# Patient Record
Sex: FEMALE | Race: WHITE | NOT HISPANIC OR LATINO | Employment: FULL TIME | URBAN - METROPOLITAN AREA
[De-identification: names, ages, dates, MRNs, and addresses within clinical notes are randomized per-mention and may not be internally consistent; named-entity substitution may affect disease eponyms.]

---

## 2017-03-10 ENCOUNTER — HOSPITAL ENCOUNTER (OUTPATIENT)
Dept: URGENT CARE | Facility: CLINIC | Age: 22
Discharge: HOME | End: 2017-03-10
Attending: FAMILY MEDICINE | Admitting: FAMILY MEDICINE

## 2023-01-17 ENCOUNTER — HOSPITAL ENCOUNTER (EMERGENCY)
Facility: HOSPITAL | Age: 28
Discharge: HOME | End: 2023-01-18
Attending: EMERGENCY MEDICINE | Admitting: EMERGENCY MEDICINE
Payer: COMMERCIAL

## 2023-01-17 DIAGNOSIS — T43.204A: Primary | ICD-10-CM

## 2023-01-17 LAB
ALBUMIN SERPL-MCNC: 4.6 G/DL (ref 3.5–5.2)
ALBUMIN/GLOB SERPL: 1.8 G/DL
ALP SERPL-CCNC: 85 U/L (ref 39–117)
ALT SERPL W P-5'-P-CCNC: 14 U/L (ref 1–33)
ANION GAP SERPL CALCULATED.3IONS-SCNC: 11 MMOL/L (ref 5–15)
APAP SERPL-MCNC: <5 MCG/ML (ref 0–30)
AST SERPL-CCNC: 13 U/L (ref 1–32)
BASOPHILS # BLD AUTO: 0.1 10*3/MM3 (ref 0–0.2)
BASOPHILS NFR BLD AUTO: 0.9 % (ref 0–1.5)
BILIRUB SERPL-MCNC: 0.5 MG/DL (ref 0–1.2)
BUN SERPL-MCNC: 13 MG/DL (ref 6–20)
BUN/CREAT SERPL: 18.6 (ref 7–25)
CALCIUM SPEC-SCNC: 9.1 MG/DL (ref 8.6–10.5)
CHLORIDE SERPL-SCNC: 105 MMOL/L (ref 98–107)
CO2 SERPL-SCNC: 25 MMOL/L (ref 22–29)
CREAT SERPL-MCNC: 0.7 MG/DL (ref 0.57–1)
DEPRECATED RDW RBC AUTO: 41.6 FL (ref 37–54)
EGFRCR SERPLBLD CKD-EPI 2021: 121.7 ML/MIN/1.73
EOSINOPHIL # BLD AUTO: 0 10*3/MM3 (ref 0–0.4)
EOSINOPHIL NFR BLD AUTO: 0.3 % (ref 0.3–6.2)
ERYTHROCYTE [DISTWIDTH] IN BLOOD BY AUTOMATED COUNT: 13.1 % (ref 12.3–15.4)
ETHANOL UR QL: <0.01 %
GLOBULIN UR ELPH-MCNC: 2.6 GM/DL
GLUCOSE SERPL-MCNC: 115 MG/DL (ref 65–99)
HCT VFR BLD AUTO: 43.1 % (ref 34–46.6)
HGB BLD-MCNC: 14.5 G/DL (ref 12–15.9)
LYMPHOCYTES # BLD AUTO: 2 10*3/MM3 (ref 0.7–3.1)
LYMPHOCYTES NFR BLD AUTO: 23.3 % (ref 19.6–45.3)
MCH RBC QN AUTO: 29.2 PG (ref 26.6–33)
MCHC RBC AUTO-ENTMCNC: 33.6 G/DL (ref 31.5–35.7)
MCV RBC AUTO: 86.9 FL (ref 79–97)
MONOCYTES # BLD AUTO: 0.5 10*3/MM3 (ref 0.1–0.9)
MONOCYTES NFR BLD AUTO: 6.2 % (ref 5–12)
NEUTROPHILS NFR BLD AUTO: 6 10*3/MM3 (ref 1.7–7)
NEUTROPHILS NFR BLD AUTO: 69.3 % (ref 42.7–76)
NRBC BLD AUTO-RTO: 0.1 /100 WBC (ref 0–0.2)
PLATELET # BLD AUTO: 260 10*3/MM3 (ref 140–450)
PMV BLD AUTO: 7.5 FL (ref 6–12)
POTASSIUM SERPL-SCNC: 3.5 MMOL/L (ref 3.5–5.2)
PROT SERPL-MCNC: 7.2 G/DL (ref 6–8.5)
RBC # BLD AUTO: 4.95 10*6/MM3 (ref 3.77–5.28)
SALICYLATES SERPL-MCNC: <0.3 MG/DL
SODIUM SERPL-SCNC: 141 MMOL/L (ref 136–145)
WBC NRBC COR # BLD: 8.6 10*3/MM3 (ref 3.4–10.8)

## 2023-01-17 PROCEDURE — 82077 ASSAY SPEC XCP UR&BREATH IA: CPT | Performed by: EMERGENCY MEDICINE

## 2023-01-17 PROCEDURE — 80053 COMPREHEN METABOLIC PANEL: CPT | Performed by: EMERGENCY MEDICINE

## 2023-01-17 PROCEDURE — 85025 COMPLETE CBC W/AUTO DIFF WBC: CPT | Performed by: EMERGENCY MEDICINE

## 2023-01-17 PROCEDURE — 80143 DRUG ASSAY ACETAMINOPHEN: CPT | Performed by: EMERGENCY MEDICINE

## 2023-01-17 PROCEDURE — 99284 EMERGENCY DEPT VISIT MOD MDM: CPT

## 2023-01-17 PROCEDURE — 80179 DRUG ASSAY SALICYLATE: CPT | Performed by: EMERGENCY MEDICINE

## 2023-01-17 PROCEDURE — 93005 ELECTROCARDIOGRAM TRACING: CPT | Performed by: EMERGENCY MEDICINE

## 2023-01-17 RX ORDER — SODIUM CHLORIDE 0.9 % (FLUSH) 0.9 %
10 SYRINGE (ML) INJECTION AS NEEDED
Status: DISCONTINUED | OUTPATIENT
Start: 2023-01-17 | End: 2023-01-18 | Stop reason: HOSPADM

## 2023-01-17 RX ADMIN — POISON ADSORBENT 50 G: 50 SUSPENSION ORAL at 23:23

## 2023-01-17 RX ADMIN — SODIUM CHLORIDE, POTASSIUM CHLORIDE, SODIUM LACTATE AND CALCIUM CHLORIDE 1000 ML: 600; 310; 30; 20 INJECTION, SOLUTION INTRAVENOUS at 23:21

## 2023-01-17 NOTE — Clinical Note
[facility] EMERGENCY DEPARTMENT  [address] [place] [address]  Phone: [phone]    David accompanied Lucille Melo to the emergency department on 1/17/2023. They may return to work on 01/19/2023.        Thank you for choosing [facility].    Gerardo Benitez MD

## 2023-01-17 NOTE — Clinical Note
[facility] EMERGENCY DEPARTMENT  [address] [place] [address]  Phone: [phone]    Lucille Melo was seen and treated in our emergency department on 1/17/2023.  She may return to work on 01/19/2023.         Thank you for choosing [facility].    Gerardo Benitez MD

## 2023-01-18 VITALS
DIASTOLIC BLOOD PRESSURE: 74 MMHG | BODY MASS INDEX: 32.43 KG/M2 | HEIGHT: 63 IN | SYSTOLIC BLOOD PRESSURE: 118 MMHG | WEIGHT: 183 LBS | TEMPERATURE: 98.1 F | OXYGEN SATURATION: 96 % | HEART RATE: 92 BPM | RESPIRATION RATE: 19 BRPM

## 2023-01-18 LAB
AMPHET+METHAMPHET UR QL: NEGATIVE
BARBITURATES UR QL SCN: NEGATIVE
BENZODIAZ UR QL SCN: NEGATIVE
CANNABINOIDS SERPL QL: NEGATIVE
COCAINE UR QL: NEGATIVE
HOLD SPECIMEN: NORMAL
METHADONE UR QL SCN: NEGATIVE
OPIATES UR QL: NEGATIVE
OXYCODONE UR QL SCN: NEGATIVE
WHOLE BLOOD HOLD COAG: NORMAL

## 2023-01-18 PROCEDURE — 80307 DRUG TEST PRSMV CHEM ANLYZR: CPT | Performed by: EMERGENCY MEDICINE

## 2023-01-18 PROCEDURE — 96374 THER/PROPH/DIAG INJ IV PUSH: CPT

## 2023-01-18 PROCEDURE — 25010000002 ONDANSETRON PER 1 MG: Performed by: EMERGENCY MEDICINE

## 2023-01-18 RX ORDER — ONDANSETRON 2 MG/ML
4 INJECTION INTRAMUSCULAR; INTRAVENOUS ONCE
Status: COMPLETED | OUTPATIENT
Start: 2023-01-18 | End: 2023-01-18

## 2023-01-18 RX ORDER — ACETAMINOPHEN 500 MG
1000 TABLET ORAL ONCE
Status: DISCONTINUED | OUTPATIENT
Start: 2023-01-18 | End: 2023-01-18 | Stop reason: HOSPADM

## 2023-01-18 RX ADMIN — ONDANSETRON 4 MG: 2 INJECTION INTRAMUSCULAR; INTRAVENOUS at 03:29
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
## 2023-01-18 NOTE — ED NOTES
Poison control updated. They states that patient will need to be observed until 0600. ER provider and patient made aware.

## 2023-01-18 NOTE — ED NOTES
Patient had successful emesis post charcoal. Patient also states that she has had a partial hysterectomy and can not get pregnant.  ER provider notified. UA pregnancy DC'd
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
## 2023-01-18 NOTE — DISCHARGE INSTRUCTIONS
Follow-up with your therapist today.  Return for suicidal thoughts vomiting altered mental status reoccurrence of symptoms or any other new or worsening problems or concerns  Rest plenty of fluids today drink lots of water.  Follow-up as directed above

## 2023-01-24 LAB — QT INTERVAL: 351 MS

## 2025-06-18 ENCOUNTER — HOSPITAL ENCOUNTER (EMERGENCY)
Facility: HOSPITAL | Age: 30
Discharge: HOME | End: 2025-06-18
Attending: EMERGENCY MEDICINE
Payer: COMMERCIAL

## 2025-06-18 ENCOUNTER — APPOINTMENT (OUTPATIENT)
Dept: CT IMAGING | Facility: HOSPITAL | Age: 30
End: 2025-06-18
Payer: COMMERCIAL

## 2025-06-18 VITALS
OXYGEN SATURATION: 99 % | TEMPERATURE: 98.2 F | WEIGHT: 156 LBS | BODY MASS INDEX: 26.63 KG/M2 | RESPIRATION RATE: 16 BRPM | HEART RATE: 82 BPM | SYSTOLIC BLOOD PRESSURE: 116 MMHG | HEIGHT: 64 IN | DIASTOLIC BLOOD PRESSURE: 75 MMHG

## 2025-06-18 DIAGNOSIS — R59.1 LYMPHADENOPATHY: ICD-10-CM

## 2025-06-18 DIAGNOSIS — R51.9 ACUTE NONINTRACTABLE HEADACHE, UNSPECIFIED HEADACHE TYPE: Primary | ICD-10-CM

## 2025-06-18 LAB — STREP A PCR: NOT DETECTED

## 2025-06-18 PROCEDURE — 96361 HYDRATE IV INFUSION ADD-ON: CPT

## 2025-06-18 PROCEDURE — 70450 CT HEAD/BRAIN W/O DYE: CPT

## 2025-06-18 PROCEDURE — 96375 TX/PRO/DX INJ NEW DRUG ADDON: CPT

## 2025-06-18 PROCEDURE — 99283 EMERGENCY DEPT VISIT LOW MDM: CPT | Performed by: EMERGENCY MEDICINE

## 2025-06-18 PROCEDURE — 25010000002 ONDANSETRON PER 1 MG: Performed by: EMERGENCY MEDICINE

## 2025-06-18 PROCEDURE — 25810000003 SODIUM CHLORIDE 0.9 % SOLUTION: Performed by: EMERGENCY MEDICINE

## 2025-06-18 PROCEDURE — 99284 EMERGENCY DEPT VISIT MOD MDM: CPT

## 2025-06-18 PROCEDURE — 72125 CT NECK SPINE W/O DYE: CPT

## 2025-06-18 PROCEDURE — 87081 CULTURE SCREEN ONLY: CPT | Performed by: EMERGENCY MEDICINE

## 2025-06-18 PROCEDURE — 87651 STREP A DNA AMP PROBE: CPT | Performed by: EMERGENCY MEDICINE

## 2025-06-18 PROCEDURE — 25010000002 KETOROLAC TROMETHAMINE PER 15 MG: Performed by: EMERGENCY MEDICINE

## 2025-06-18 PROCEDURE — 96374 THER/PROPH/DIAG INJ IV PUSH: CPT

## 2025-06-18 RX ORDER — ONDANSETRON 4 MG/1
4 TABLET, ORALLY DISINTEGRATING ORAL EVERY 8 HOURS PRN
Qty: 12 TABLET | Refills: 0 | Status: SHIPPED | OUTPATIENT
Start: 2025-06-18

## 2025-06-18 RX ORDER — KETOROLAC TROMETHAMINE 30 MG/ML
15 INJECTION, SOLUTION INTRAMUSCULAR; INTRAVENOUS ONCE
Status: COMPLETED | OUTPATIENT
Start: 2025-06-18 | End: 2025-06-18

## 2025-06-18 RX ORDER — ONDANSETRON 2 MG/ML
4 INJECTION INTRAMUSCULAR; INTRAVENOUS ONCE
Status: COMPLETED | OUTPATIENT
Start: 2025-06-18 | End: 2025-06-18

## 2025-06-18 RX ORDER — SODIUM CHLORIDE 0.9 % (FLUSH) 0.9 %
10 SYRINGE (ML) INJECTION AS NEEDED
Status: DISCONTINUED | OUTPATIENT
Start: 2025-06-18 | End: 2025-06-19 | Stop reason: HOSPADM

## 2025-06-18 RX ORDER — ACETAMINOPHEN 325 MG/1
650 TABLET ORAL ONCE
Status: COMPLETED | OUTPATIENT
Start: 2025-06-18 | End: 2025-06-18

## 2025-06-18 RX ADMIN — KETOROLAC TROMETHAMINE 15 MG: 30 INJECTION, SOLUTION INTRAMUSCULAR at 23:11

## 2025-06-18 RX ADMIN — SODIUM CHLORIDE 1000 ML: 9 INJECTION, SOLUTION INTRAVENOUS at 22:38

## 2025-06-18 RX ADMIN — ACETAMINOPHEN 650 MG: 325 TABLET, FILM COATED ORAL at 22:16

## 2025-06-18 RX ADMIN — ONDANSETRON 4 MG: 2 INJECTION, SOLUTION INTRAMUSCULAR; INTRAVENOUS at 22:40

## 2025-06-19 NOTE — FSED PROVIDER NOTE
Subjective   History of Present Illness  30 yof complains of headache. The patient states he has been having migraines for the past month. She saw her Doctor who prescribed her medication. Over the past few days she has noticed pain in the right side of her neck. She notes it is worse when she turns her head. She denies injury. Tonight she noticed some bumps on the back of her head. She has also had a sore throat and her  was recently diagnosed with strep.       Review of Systems   Constitutional: Negative.    HENT:  Positive for sore throat.    Musculoskeletal:  Positive for neck pain.   Neurological:  Positive for headaches.   All other systems reviewed and are negative.      Past Medical History:   Diagnosis Date    ADHD     Bipolar 1 disorder, depressed        Allergies   Allergen Reactions    Penicillins Anaphylaxis       Past Surgical History:   Procedure Laterality Date    CYST REMOVAL Left 2013    hand    DILATION AND CURETTAGE, DIAGNOSTIC / THERAPEUTIC  2017    for undeliverd placenta    LAPAROSCOPIC TUBAL LIGATION         Family History   Problem Relation Age of Onset    Uterine cancer Mother     Lupus Mother     Fibromyalgia Mother     Hypertension Father     Diabetes Father        Social History     Socioeconomic History    Marital status:    Tobacco Use    Smoking status: Never     Passive exposure: Past (as a child)    Smokeless tobacco: Never   Vaping Use    Vaping status: Never Used   Substance and Sexual Activity    Alcohol use: Not Currently    Drug use: Never    Sexual activity: Defer           Objective   Physical Exam  Vitals reviewed.   Constitutional:       Appearance: Normal appearance.   HENT:      Head: Normocephalic and atraumatic.      Right Ear: Tympanic membrane, ear canal and external ear normal.      Left Ear: Tympanic membrane, ear canal and external ear normal.      Mouth/Throat:      Mouth: Mucous membranes are moist.      Pharynx: Oropharynx is clear. Posterior  oropharyngeal erythema present.   Eyes:      Extraocular Movements: Extraocular movements intact.      Pupils: Pupils are equal, round, and reactive to light.   Neck:      Trachea: Trachea and phonation normal.      Meningeal: Brudzinski's sign and Kernig's sign absent.     Cardiovascular:      Rate and Rhythm: Normal rate and regular rhythm.      Pulses: Normal pulses.      Heart sounds: Normal heart sounds.   Pulmonary:      Effort: Pulmonary effort is normal.      Breath sounds: Normal breath sounds.   Musculoskeletal:         General: Normal range of motion.      Cervical back: Normal range of motion and neck supple. No rigidity. Muscular tenderness present.   Lymphadenopathy:      Cervical: Cervical adenopathy present.   Skin:     General: Skin is warm and dry.   Neurological:      Mental Status: She is alert.         Procedures           ED Course  ED Course as of 06/19/25 0133   Wed Jun 18, 2025   2086 Pt is feeling better. Discussed test results and treatment plan.  [BM]      ED Course User Index  [BM] Cindy Moore MD                                           Medical Decision Making  This patient presents with a headache most consistent with benign headache from either tension type headache vs migraine. No headache red flags. Neurologic exam without evidence of meningismus, AMS, focal neurologic findings so doubt meningitis, encephalitis, stroke. Presentation not consistent with acute intracranial bleed to include SAH (lack of risk factors, headache history). No history of trauma so doubt ICH. Given history and physical temporal arteritis unlikely, as is acute angle closure glaucoma. Doubt carotid artery dissection given no focal neuro deficits, no neck trauma or recent neck strain. Patient with no signs of increased intracranial pressure or weight loss and history and physical suggest more benign headache so less likely mass effect in brain from tumor or abscess or idiopathic intracranial  hypertension. Pain was controlled with headache cocktail and patient discharged home with PMD follow up.    Problems Addressed:  Acute nonintractable headache, unspecified headache type: complicated acute illness or injury  Lymphadenopathy: complicated acute illness or injury    Amount and/or Complexity of Data Reviewed  Radiology: ordered.    Risk  OTC drugs.  Prescription drug management.        Final diagnoses:   Acute nonintractable headache, unspecified headache type   Lymphadenopathy       ED Disposition  ED Disposition       ED Disposition   Discharge    Condition   Stable    Comment   --               [facility],   [id number]  [place] [address]  [phone]               Medication List        New Prescriptions      diclofenac 50 MG EC tablet  Commonly known as: VOLTAREN  Take 1 tablet by mouth 2 (Two) Times a Day As Needed (pain).     ondansetron ODT 4 MG disintegrating tablet  Commonly known as: ZOFRAN-ODT  Place 1 tablet on the tongue Every 8 (Eight) Hours As Needed for Nausea.            Stop      promethazine 25 MG suppository  Commonly known as: PHENERGAN     promethazine 25 MG tablet  Commonly known as: PHENERGAN     sucralfate 1 g tablet  Commonly known as: CARAFATE               Where to Get Your Medications        These medications were sent to [facility]/pharm[facility] #[id number] - [place], [place] - [address] [place] [address] - [phone]  - [phone] [address] [place] [address]      Hours: 24-hours Phone: [phone]   diclofenac 50 MG EC tablet  ondansetron ODT 4 MG disintegrating tablet

## 2025-06-21 LAB — BACTERIA SPEC AEROBE CULT: NORMAL

## 2025-06-23 ENCOUNTER — HOSPITAL ENCOUNTER (OUTPATIENT)
Facility: HOSPITAL | Age: 30
Setting detail: OBSERVATION
Discharge: HOME | End: 2025-06-24
Attending: STUDENT IN AN ORGANIZED HEALTH CARE EDUCATION/TRAINING PROGRAM | Admitting: STUDENT IN AN ORGANIZED HEALTH CARE EDUCATION/TRAINING PROGRAM
Payer: COMMERCIAL

## 2025-06-23 ENCOUNTER — APPOINTMENT (OUTPATIENT)
Dept: CT IMAGING | Facility: HOSPITAL | Age: 30
End: 2025-06-23
Payer: COMMERCIAL

## 2025-06-23 PROBLEM — N73.9 PID (PELVIC INFLAMMATORY DISEASE): Status: ACTIVE | Noted: 2025-06-23

## 2025-06-23 PROBLEM — R10.2 PELVIC PAIN: Status: ACTIVE | Noted: 2025-06-23

## 2025-06-23 LAB
ALBUMIN SERPL-MCNC: 4.2 G/DL (ref 3.5–5.2)
ALBUMIN/GLOB SERPL: 1.8 G/DL
ALP SERPL-CCNC: 63 U/L (ref 39–117)
ALT SERPL W P-5'-P-CCNC: 14 U/L (ref 1–33)
ANION GAP SERPL CALCULATED.3IONS-SCNC: 9.1 MMOL/L (ref 5–15)
AST SERPL-CCNC: 17 U/L (ref 1–32)
BASOPHILS # BLD AUTO: 0.09 10*3/MM3 (ref 0–0.2)
BASOPHILS NFR BLD AUTO: 1.5 % (ref 0–1.5)
BILIRUB SERPL-MCNC: 0.4 MG/DL (ref 0–1.2)
BILIRUB UR QL STRIP: NEGATIVE
BUN SERPL-MCNC: 4.2 MG/DL (ref 6–20)
BUN/CREAT SERPL: 7.5 (ref 7–25)
C TRACH DNA SPEC QL NAA+PROBE: NOT DETECTED
CALCIUM SPEC-SCNC: 8.7 MG/DL (ref 8.6–10.5)
CHLORIDE SERPL-SCNC: 106 MMOL/L (ref 98–107)
CLARITY UR: ABNORMAL
CO2 SERPL-SCNC: 23.9 MMOL/L (ref 22–29)
COLOR UR: YELLOW
CREAT SERPL-MCNC: 0.56 MG/DL (ref 0.57–1)
DEPRECATED RDW RBC AUTO: 41.1 FL (ref 37–54)
EGFRCR SERPLBLD CKD-EPI 2021: 126.1 ML/MIN/1.73
EOSINOPHIL # BLD AUTO: 0.15 10*3/MM3 (ref 0–0.4)
EOSINOPHIL NFR BLD AUTO: 2.5 % (ref 0.3–6.2)
ERYTHROCYTE [DISTWIDTH] IN BLOOD BY AUTOMATED COUNT: 12.4 % (ref 12.3–15.4)
GLOBULIN UR ELPH-MCNC: 2.3 GM/DL
GLUCOSE SERPL-MCNC: 88 MG/DL (ref 65–99)
GLUCOSE UR STRIP-MCNC: NEGATIVE MG/DL
HCT VFR BLD AUTO: 39.1 % (ref 34–46.6)
HGB BLD-MCNC: 12 G/DL (ref 12–15.9)
HGB UR QL STRIP.AUTO: NEGATIVE
IMM GRANULOCYTES # BLD AUTO: 0.01 10*3/MM3 (ref 0–0.05)
IMM GRANULOCYTES NFR BLD AUTO: 0.2 % (ref 0–0.5)
KETONES UR QL STRIP: NEGATIVE
LEUKOCYTE ESTERASE UR QL STRIP.AUTO: NEGATIVE
LYMPHOCYTES # BLD AUTO: 1.73 10*3/MM3 (ref 0.7–3.1)
LYMPHOCYTES NFR BLD AUTO: 28.5 % (ref 19.6–45.3)
MCH RBC QN AUTO: 28.3 PG (ref 26.6–33)
MCHC RBC AUTO-ENTMCNC: 30.7 G/DL (ref 31.5–35.7)
MCV RBC AUTO: 92.2 FL (ref 79–97)
MONOCYTES # BLD AUTO: 0.59 10*3/MM3 (ref 0.1–0.9)
MONOCYTES NFR BLD AUTO: 9.7 % (ref 5–12)
N GONORRHOEA RRNA SPEC QL NAA+PROBE: NOT DETECTED
NEUTROPHILS NFR BLD AUTO: 3.51 10*3/MM3 (ref 1.7–7)
NEUTROPHILS NFR BLD AUTO: 57.6 % (ref 42.7–76)
NITRITE UR QL STRIP: NEGATIVE
NRBC BLD AUTO-RTO: 0 /100 WBC (ref 0–0.2)
PH UR STRIP.AUTO: 8 [PH] (ref 5–8)
PLATELET # BLD AUTO: 234 10*3/MM3 (ref 140–450)
PMV BLD AUTO: 9.2 FL (ref 6–12)
POTASSIUM SERPL-SCNC: 3.9 MMOL/L (ref 3.5–5.2)
PROT SERPL-MCNC: 6.5 G/DL (ref 6–8.5)
PROT UR QL STRIP: NEGATIVE
RBC # BLD AUTO: 4.24 10*6/MM3 (ref 3.77–5.28)
SODIUM SERPL-SCNC: 139 MMOL/L (ref 136–145)
SP GR UR STRIP: 1.01 (ref 1–1.03)
TRICHOMONAS VAGINALIS PCR: NOT DETECTED
UROBILINOGEN UR QL STRIP: ABNORMAL
WBC NRBC COR # BLD AUTO: 6.08 10*3/MM3 (ref 3.4–10.8)

## 2025-06-23 PROCEDURE — 25010000002 CEFTRIAXONE PER 250 MG: Performed by: STUDENT IN AN ORGANIZED HEALTH CARE EDUCATION/TRAINING PROGRAM

## 2025-06-23 PROCEDURE — 80053 COMPREHEN METABOLIC PANEL: CPT | Performed by: STUDENT IN AN ORGANIZED HEALTH CARE EDUCATION/TRAINING PROGRAM

## 2025-06-23 PROCEDURE — 74177 CT ABD & PELVIS W/CONTRAST: CPT

## 2025-06-23 PROCEDURE — 25010000002 METRONIDAZOLE 500 MG/100ML SOLUTION: Performed by: STUDENT IN AN ORGANIZED HEALTH CARE EDUCATION/TRAINING PROGRAM

## 2025-06-23 PROCEDURE — G0378 HOSPITAL OBSERVATION PER HR: HCPCS

## 2025-06-23 PROCEDURE — 87591 N.GONORRHOEAE DNA AMP PROB: CPT | Performed by: STUDENT IN AN ORGANIZED HEALTH CARE EDUCATION/TRAINING PROGRAM

## 2025-06-23 PROCEDURE — 25010000002 KETOROLAC TROMETHAMINE PER 15 MG: Performed by: STUDENT IN AN ORGANIZED HEALTH CARE EDUCATION/TRAINING PROGRAM

## 2025-06-23 PROCEDURE — 96374 THER/PROPH/DIAG INJ IV PUSH: CPT

## 2025-06-23 PROCEDURE — 81003 URINALYSIS AUTO W/O SCOPE: CPT | Performed by: STUDENT IN AN ORGANIZED HEALTH CARE EDUCATION/TRAINING PROGRAM

## 2025-06-23 PROCEDURE — 25510000001 IOPAMIDOL PER 1 ML: Performed by: STUDENT IN AN ORGANIZED HEALTH CARE EDUCATION/TRAINING PROGRAM

## 2025-06-23 PROCEDURE — 87491 CHLMYD TRACH DNA AMP PROBE: CPT | Performed by: STUDENT IN AN ORGANIZED HEALTH CARE EDUCATION/TRAINING PROGRAM

## 2025-06-23 PROCEDURE — 25010000002 DOXYCYCLINE 100 MG RECONSTITUTED SOLUTION 1 EACH VIAL: Performed by: STUDENT IN AN ORGANIZED HEALTH CARE EDUCATION/TRAINING PROGRAM

## 2025-06-23 PROCEDURE — G0379 DIRECT REFER HOSPITAL OBSERV: HCPCS

## 2025-06-23 PROCEDURE — 85025 COMPLETE CBC W/AUTO DIFF WBC: CPT | Performed by: STUDENT IN AN ORGANIZED HEALTH CARE EDUCATION/TRAINING PROGRAM

## 2025-06-23 PROCEDURE — 96376 TX/PRO/DX INJ SAME DRUG ADON: CPT

## 2025-06-23 PROCEDURE — 87661 TRICHOMONAS VAGINALIS AMPLIF: CPT | Performed by: STUDENT IN AN ORGANIZED HEALTH CARE EDUCATION/TRAINING PROGRAM

## 2025-06-23 RX ORDER — SODIUM CHLORIDE 0.9 % (FLUSH) 0.9 %
10 SYRINGE (ML) INJECTION EVERY 12 HOURS SCHEDULED
Status: DISCONTINUED | OUTPATIENT
Start: 2025-06-23 | End: 2025-06-24 | Stop reason: HOSPADM

## 2025-06-23 RX ORDER — ACETAMINOPHEN 500 MG
1000 TABLET ORAL EVERY 6 HOURS PRN
Status: DISCONTINUED | OUTPATIENT
Start: 2025-06-23 | End: 2025-06-24 | Stop reason: HOSPADM

## 2025-06-23 RX ORDER — FERROUS SULFATE 325(65) MG
325 TABLET ORAL
COMMUNITY

## 2025-06-23 RX ORDER — IOPAMIDOL 755 MG/ML
100 INJECTION, SOLUTION INTRAVASCULAR
Status: COMPLETED | OUTPATIENT
Start: 2025-06-23 | End: 2025-06-23

## 2025-06-23 RX ORDER — SACCHAROMYCES BOULARDII 250 MG
250 CAPSULE ORAL 2 TIMES DAILY
COMMUNITY

## 2025-06-23 RX ORDER — METRONIDAZOLE 500 MG/100ML
500 INJECTION, SOLUTION INTRAVENOUS EVERY 12 HOURS
Status: DISCONTINUED | OUTPATIENT
Start: 2025-06-23 | End: 2025-06-24

## 2025-06-23 RX ORDER — OXYCODONE HYDROCHLORIDE 5 MG/1
5 TABLET ORAL EVERY 6 HOURS PRN
Refills: 0 | Status: DISCONTINUED | OUTPATIENT
Start: 2025-06-23 | End: 2025-06-24 | Stop reason: HOSPADM

## 2025-06-23 RX ORDER — KETOROLAC TROMETHAMINE 30 MG/ML
30 INJECTION, SOLUTION INTRAMUSCULAR; INTRAVENOUS EVERY 6 HOURS PRN
Status: DISCONTINUED | OUTPATIENT
Start: 2025-06-23 | End: 2025-06-24 | Stop reason: HOSPADM

## 2025-06-23 RX ORDER — SODIUM CHLORIDE 9 MG/ML
40 INJECTION, SOLUTION INTRAVENOUS AS NEEDED
Status: DISCONTINUED | OUTPATIENT
Start: 2025-06-23 | End: 2025-06-24 | Stop reason: HOSPADM

## 2025-06-23 RX ORDER — SODIUM CHLORIDE 0.9 % (FLUSH) 0.9 %
10 SYRINGE (ML) INJECTION AS NEEDED
Status: DISCONTINUED | OUTPATIENT
Start: 2025-06-23 | End: 2025-06-24 | Stop reason: HOSPADM

## 2025-06-23 RX ADMIN — IOPAMIDOL 100 ML: 755 INJECTION, SOLUTION INTRAVENOUS at 18:15

## 2025-06-23 RX ADMIN — KETOROLAC TROMETHAMINE 30 MG: 30 INJECTION INTRAMUSCULAR; INTRAVENOUS at 22:17

## 2025-06-23 RX ADMIN — KETOROLAC TROMETHAMINE 30 MG: 30 INJECTION INTRAMUSCULAR; INTRAVENOUS at 15:55

## 2025-06-23 RX ADMIN — METRONIDAZOLE 500 MG: 500 INJECTION, SOLUTION INTRAVENOUS at 17:43

## 2025-06-23 RX ADMIN — CEFTRIAXONE SODIUM 1000 MG: 1 INJECTION, POWDER, FOR SOLUTION INTRAMUSCULAR; INTRAVENOUS at 19:13

## 2025-06-23 RX ADMIN — DOXYCYCLINE 100 MG: 100 INJECTION, POWDER, LYOPHILIZED, FOR SOLUTION INTRAVENOUS at 15:50

## 2025-06-23 NOTE — LETTER
June 23, 2025      [facility]  [address] [place] [address] [phone]          Patient: Lucille Melo   YOB: 1995   Date of Visit: 6/23/2025       To Whom It May Concern:    Lucille Melo was seen at [facility] on 6/23/2025.    Please excuse Bonifacio Melo from work today.        Sincerely,       Dr. Frieda Alcantar

## 2025-06-23 NOTE — LETTER
June 23, 2025      [facility]  [address] [place] [address] [phone]          Patient: Lucille Melo   YOB: 1995   Date of Visit: 6/23/2025       To Whom It May Concern:    Lucille Melo was seen at [facility] on 6/23/2025.    Please excuse Bonifacio Melo from work today.        Sincerely,       Dr. Frieda Alcantar

## 2025-06-23 NOTE — LETTER
June 24, 2025     Patient: Lucille Melo   YOB: 1995   Date of Visit: 6/23/2025       To Whom It May Concern:    Lucille Melo was admitted on 6/23/2025, treated and discharged on 6/24/25.  It is my medical opinion that Lucille Melo {Work release (duty restriction):[id number]}.           Sincerely,        No name on file    CC: No Recipients

## 2025-06-24 VITALS
HEART RATE: 76 BPM | DIASTOLIC BLOOD PRESSURE: 76 MMHG | OXYGEN SATURATION: 100 % | TEMPERATURE: 97.9 F | WEIGHT: 164.4 LBS | SYSTOLIC BLOOD PRESSURE: 118 MMHG | RESPIRATION RATE: 19 BRPM | BODY MASS INDEX: 28.22 KG/M2

## 2025-06-24 PROCEDURE — 25010000002 DOXYCYCLINE 100 MG RECONSTITUTED SOLUTION 1 EACH VIAL: Performed by: STUDENT IN AN ORGANIZED HEALTH CARE EDUCATION/TRAINING PROGRAM

## 2025-06-24 PROCEDURE — 96376 TX/PRO/DX INJ SAME DRUG ADON: CPT

## 2025-06-24 PROCEDURE — G0378 HOSPITAL OBSERVATION PER HR: HCPCS

## 2025-06-24 PROCEDURE — 25010000002 KETOROLAC TROMETHAMINE PER 15 MG: Performed by: STUDENT IN AN ORGANIZED HEALTH CARE EDUCATION/TRAINING PROGRAM

## 2025-06-24 PROCEDURE — 25010000002 METRONIDAZOLE 500 MG/100ML SOLUTION: Performed by: STUDENT IN AN ORGANIZED HEALTH CARE EDUCATION/TRAINING PROGRAM

## 2025-06-24 RX ORDER — METRONIDAZOLE 500 MG/1
500 TABLET ORAL EVERY 12 HOURS SCHEDULED
Status: DISCONTINUED | OUTPATIENT
Start: 2025-06-24 | End: 2025-06-24 | Stop reason: HOSPADM

## 2025-06-24 RX ORDER — DOXYCYCLINE 100 MG/1
100 CAPSULE ORAL EVERY 12 HOURS SCHEDULED
Status: DISCONTINUED | OUTPATIENT
Start: 2025-06-24 | End: 2025-06-24 | Stop reason: HOSPADM

## 2025-06-24 RX ADMIN — DOXYCYCLINE 100 MG: 100 INJECTION, POWDER, LYOPHILIZED, FOR SOLUTION INTRAVENOUS at 02:51

## 2025-06-24 RX ADMIN — KETOROLAC TROMETHAMINE 30 MG: 30 INJECTION INTRAMUSCULAR; INTRAVENOUS at 06:30

## 2025-06-24 RX ADMIN — KETOROLAC TROMETHAMINE 30 MG: 30 INJECTION INTRAMUSCULAR; INTRAVENOUS at 12:22

## 2025-06-24 RX ADMIN — METRONIDAZOLE 500 MG: 500 INJECTION, SOLUTION INTRAVENOUS at 05:45

## 2025-06-24 RX ADMIN — Medication 10 ML: at 12:22

## 2025-06-24 RX ADMIN — ACETAMINOPHEN 1000 MG: 500 TABLET, FILM COATED ORAL at 07:38

## 2025-06-24 RX ADMIN — DOXYCYCLINE 100 MG: 100 CAPSULE ORAL at 14:48

## 2025-06-24 NOTE — PLAN OF CARE
Goal Outcome Evaluation:  Plan of Care Reviewed With: patient           Outcome Evaluation: Discharge instructions given and explained. warning s/s, when to call MD or come to ER was discussed. Pt to call for f/u in 1-2 weeks. Pt left per ambulatory to car to home.

## 2025-06-24 NOTE — PROGRESS NOTES
LOS: 1 day   Patient Care Team:  Ranjit Cavanaugh MD as PCP - General (Family Medicine)    Chief Complaint:  pelvic pain    Subjective     Interval History:     Patient Complaints: Patient admitted yesterday for persistent pelvic pain and sent from office d/t US-GYN with findings concerning for possible PID/TOA.  She was sent from office and started IV antibiotics.  States continued pain that is lower pelvic region since started on IV antibiotics yesterday.  All labs unremarkable and WBC WNR, normal UA, and negative cultures for G/C/T.  BV/yeast Nuswab continued pending status.  Patient denies n/v and tolerating po fluids and normal diet at this time.  Pain slightly improved with Torodol and Tylenol this morning but reports 4/10 at this time.  Reports normal BM and regular stool softener use prn at home.   Reports last BM was last night around midnight.    History taken from: patient and chart    Review of Systems:    Same as H&P    Objective     Vital Signs  Vitals:    06/23/25 2310 06/23/25 2313 06/24/25 0314 06/24/25 0731   BP: 118/76 128/71 114/75 91/56   BP Location: Right arm Right arm Right arm Right arm   Patient Position: Sitting Lying Lying Lying   Pulse: 93 93 85 76   Resp: 17 16 17 18   Temp: 97.9 °F (36.6 °C) 98.3 °F (36.8 °C) 97.9 °F (36.6 °C) 98.2 °F (36.8 °C)   TempSrc: Oral Oral Oral Oral   SpO2: 97% 97% 98% 98%   Weight:           Physical Exam:     General Appearance:    Alert, cooperative, in no acute distress   Lungs:     Clear to auscultation,respirations regular, even and                   unlabored    Heart:    Regular rhythm and normal rate.   Breast Exam:    Deferred   Abdomen:     Normal bowel sounds, no masses, no organomegaly, soft        non-tender, non-distended, no guarding, no rebound                 tenderness   Genitalia:    Deferred   Extremities:   Moves all extremities well, no edema, no cyanosis, no             redness        Labs:  Lab Results (last 48 hours)        Procedure Component Value Units Date/Time    Chlamydia trachomatis, Neisseria gonorrhoeae, Trichomonas vaginalis, PCR - Urine, Urine, Clean Catch [[id number]]  (Normal) Collected: 06/23/25 1439    Specimen: Urine, Clean Catch Updated: 06/23/25 1628     Chlamydia by PCR Not Detected     Neisseria gonorrhoeae by PCR Not Detected     Trichomonas vaginalis PCR Not Detected    Comprehensive Metabolic Panel [[id number]]  (Abnormal) Collected: 06/23/25 1430    Specimen: Blood from Arm, Left Updated: 06/23/25 1513     Glucose 88 mg/dL      BUN 4.2 mg/dL      Creatinine 0.56 mg/dL      Sodium 139 mmol/L      Potassium 3.9 mmol/L      Chloride 106 mmol/L      CO2 23.9 mmol/L      Calcium 8.7 mg/dL      Total Protein 6.5 g/dL      Albumin 4.2 g/dL      ALT (SGPT) 14 U/L      AST (SGOT) 17 U/L      Alkaline Phosphatase 63 U/L      Total Bilirubin 0.4 mg/dL      Globulin 2.3 gm/dL      A/G Ratio 1.8 g/dL      BUN/Creatinine Ratio 7.5     Anion Gap 9.1 mmol/L      eGFR 126.1 mL/min/1.73     Narrative:      GFR Categories in Chronic Kidney Disease (CKD)              GFR Category          GFR (mL/min/1.73)    Interpretation  G1                    90 or greater        Normal or high (1)  G2                    60-89                Mild decrease (1)  G3a                   45-59                Mild to moderate decrease  G3b                   30-44                Moderate to severe decrease  G4                    15-29                Severe decrease  G5                    14 or less           Kidney failure    (1)In the absence of evidence of kidney disease, neither GFR category G1 or G2 fulfill the criteria for CKD.    eGFR calculation 2021 CKD-EPI creatinine equation, which does not include race as a factor    Urinalysis With Culture If Indicated - Urine, Clean Catch [[id number]]  (Abnormal) Collected: 06/23/25 1430    Specimen: Urine, Clean Catch Updated: 06/23/25 1456     Color, UA Yellow     Appearance, UA Cloudy     pH, UA 8.0      Specific Gravity, UA 1.013     Glucose, UA Negative     Ketones, UA Negative     Bilirubin, UA Negative     Blood, UA Negative     Protein, UA Negative     Leuk Esterase, UA Negative     Nitrite, UA Negative     Urobilinogen, UA 0.2 E.U./dL    Narrative:      In absence of clinical symptoms, the presence of pyuria, bacteria, and/or nitrites on the urinalysis result does not correlate with infection.  Urine microscopic not indicated.    CBC & Differential [[id number]]  (Abnormal) Collected: 06/23/25 1430    Specimen: Blood from Arm, Left Updated: 06/23/25 1453    Narrative:      The following orders were created for panel order CBC & Differential.  Procedure                               Abnormality         Status                     ---------                               -----------         ------                     CBC Auto Differential[[id number]]        Abnormal            Final result                 Please view results for these tests on the individual orders.    CBC Auto Differential [[id number]]  (Abnormal) Collected: 06/23/25 1430    Specimen: Blood from Arm, Left Updated: 06/23/25 1453     WBC 6.08 10*3/mm3      RBC 4.24 10*6/mm3      Hemoglobin 12.0 g/dL      Hematocrit 39.1 %      MCV 92.2 fL      MCH 28.3 pg      MCHC 30.7 g/dL      RDW 12.4 %      RDW-SD 41.1 fl      MPV 9.2 fL      Platelets 234 10*3/mm3      Neutrophil % 57.6 %      Lymphocyte % 28.5 %      Monocyte % 9.7 %      Eosinophil % 2.5 %      Basophil % 1.5 %      Immature Grans % 0.2 %      Neutrophils, Absolute 3.51 10*3/mm3      Lymphocytes, Absolute 1.73 10*3/mm3      Monocytes, Absolute 0.59 10*3/mm3      Eosinophils, Absolute 0.15 10*3/mm3      Basophils, Absolute 0.09 10*3/mm3      Immature Grans, Absolute 0.01 10*3/mm3      nRBC 0.0 /100 WBC             Radiology:  Imaging Results (Last 48 Hours)       Procedure Component Value Units Date/Time    CT Abdomen Pelvis With Contrast [[id number]] Collected: 06/23/25 2056     Updated:  06/23/25 2105    Narrative:      CT ABDOMEN PELVIS W CONTRAST    Date of Exam: 6/23/2025 6:00 PM EDT    Indication: PELVIC PAIN.    Comparison: CT abdomen pelvis 8/27/2009. Abdominal radiograph 1/27/2012.    Technique: Axial CT images were obtained of the abdomen and pelvis following the uneventful intravenous administration of iodinated contrast. Sagittal and coronal reconstructions were performed.  Automated exposure control and iterative reconstruction   methods were used.        Findings:  Included Chest: No significant abnormality.    Liver: No significant abnormality.     Gallbladder and biliary tree: No significant abnormality.     Spleen: No significant abnormality.     Pancreas: No significant abnormality.     Adrenal glands: No significant abnormality.     Kidneys and ureters: Left renal cyst. Nonobstructing left nephrolithiasis.. No hydroureteronephrosis.     Stomach and duodenum:No significant abnormality.    Small and large bowel: No significant abnormality. No evidence of bowel obstruction. Normal-appearing appendix.    Peritoneal cavity: Trace volume free fluid in the pelvis, which may be physiologic. No free air.    Bladder: Under distended and incompletely evaluated.     Pelvic organs: No significant abnormality. No visible surgical clips in the pelvis. Visualization of the fallopian tubes can be outside the resolution of CT in the absence of hydrosalpinx.     Vasculature: No significant abnormality     Lymph nodes: No pathologic appearing lymph nodes by imaging criteria.     Bones and soft tissues: Degenerative changes of the lower lumbar spine. No acute osseous abnormality.      Impression:      Impression:  No acute findings in the abdomen or pelvis.    Nonobstructing left nephrolithiasis.    Trace volume free fluid in the pelvis, which may be physiologic.        Electronically Signed: Greg Serrano MD    6/23/2025 9:03 PM EDT    Workstation ID: [id number]              Assessment & Plan        Pelvic pain    PID (pelvic inflammatory disease)      Consulted with Dr. Arcos (o/c GYN)  Patient to discontinue IV antibiotics and can switch to po at this time d/t normal WBC and afebrile.   Continue pain management with prescribed medications and continue to monitor at this time.      BHAVANI Chapman  06/24/25  09:49 EDT

## 2025-06-24 NOTE — PROGRESS NOTES
LOS: 1 day   Patient Care Team:  Ranjit Cavanaugh MD as PCP - General (Family Medicine)    Chief Complaint:  pelvic pain    Subjective     Interval History:     Patient Complaints: Patient admitted yesterday for persistent pelvic pain and sent from office d/t US-GYN with findings concerning for possible PID/TOA.  She was sent from office and started IV antibiotics.  States continued pain that is lower pelvix  History taken from: patient and chart    Review of Systems:    {ROS Negative/Negative except for:[id number]}    Objective     Vital Signs  Vitals:    06/23/25 2310 06/23/25 2313 06/24/25 0314 06/24/25 0731   BP: 118/76 128/71 114/75 91/56   BP Location: Right arm Right arm Right arm Right arm   Patient Position: Sitting Lying Lying Lying   Pulse: 93 93 85 76   Resp: 17 16 17 18   Temp: 97.9 °F (36.6 °C) 98.3 °F (36.8 °C) 97.9 °F (36.6 °C) 98.2 °F (36.8 °C)   TempSrc: Oral Oral Oral Oral   SpO2: 97% 97% 98% 98%   Weight:           Physical Exam:     General Appearance:    Alert, cooperative, in no acute distress   Lungs:     Clear to auscultation,respirations regular, even and                   unlabored    Heart:    Regular rhythm and normal rate.   Breast Exam:    Deferred   Abdomen:     Normal bowel sounds, no masses, no organomegaly, soft        non-tender, non-distended, no guarding, no rebound                 tenderness   Genitalia:    Deferred   Extremities:   Moves all extremities well, no edema, no cyanosis, no             redness        Labs:  Lab Results (last 48 hours)       Procedure Component Value Units Date/Time    Chlamydia trachomatis, Neisseria gonorrhoeae, Trichomonas vaginalis, PCR - Urine, Urine, Clean Catch [[id number]]  (Normal) Collected: 06/23/25 1439    Specimen: Urine, Clean Catch Updated: 06/23/25 1628     Chlamydia by PCR Not Detected     Neisseria gonorrhoeae by PCR Not Detected     Trichomonas vaginalis PCR Not Detected    Comprehensive Metabolic Panel [[id number]]   (Abnormal) Collected: 06/23/25 1430    Specimen: Blood from Arm, Left Updated: 06/23/25 1513     Glucose 88 mg/dL      BUN 4.2 mg/dL      Creatinine 0.56 mg/dL      Sodium 139 mmol/L      Potassium 3.9 mmol/L      Chloride 106 mmol/L      CO2 23.9 mmol/L      Calcium 8.7 mg/dL      Total Protein 6.5 g/dL      Albumin 4.2 g/dL      ALT (SGPT) 14 U/L      AST (SGOT) 17 U/L      Alkaline Phosphatase 63 U/L      Total Bilirubin 0.4 mg/dL      Globulin 2.3 gm/dL      A/G Ratio 1.8 g/dL      BUN/Creatinine Ratio 7.5     Anion Gap 9.1 mmol/L      eGFR 126.1 mL/min/1.73     Narrative:      GFR Categories in Chronic Kidney Disease (CKD)              GFR Category          GFR (mL/min/1.73)    Interpretation  G1                    90 or greater        Normal or high (1)  G2                    60-89                Mild decrease (1)  G3a                   45-59                Mild to moderate decrease  G3b                   30-44                Moderate to severe decrease  G4                    15-29                Severe decrease  G5                    14 or less           Kidney failure    (1)In the absence of evidence of kidney disease, neither GFR category G1 or G2 fulfill the criteria for CKD.    eGFR calculation 2021 CKD-EPI creatinine equation, which does not include race as a factor    Urinalysis With Culture If Indicated - Urine, Clean Catch [[id number]]  (Abnormal) Collected: 06/23/25 1430    Specimen: Urine, Clean Catch Updated: 06/23/25 1456     Color, UA Yellow     Appearance, UA Cloudy     pH, UA 8.0     Specific Gravity, UA 1.013     Glucose, UA Negative     Ketones, UA Negative     Bilirubin, UA Negative     Blood, UA Negative     Protein, UA Negative     Leuk Esterase, UA Negative     Nitrite, UA Negative     Urobilinogen, UA 0.2 E.U./dL    Narrative:      In absence of clinical symptoms, the presence of pyuria, bacteria, and/or nitrites on the urinalysis result does not correlate with infection.  Urine  microscopic not indicated.    CBC & Differential [[id number]]  (Abnormal) Collected: 06/23/25 1430    Specimen: Blood from Arm, Left Updated: 06/23/25 1453    Narrative:      The following orders were created for panel order CBC & Differential.  Procedure                               Abnormality         Status                     ---------                               -----------         ------                     CBC Auto Differential[[id number]]        Abnormal            Final result                 Please view results for these tests on the individual orders.    CBC Auto Differential [[id number]]  (Abnormal) Collected: 06/23/25 1430    Specimen: Blood from Arm, Left Updated: 06/23/25 1453     WBC 6.08 10*3/mm3      RBC 4.24 10*6/mm3      Hemoglobin 12.0 g/dL      Hematocrit 39.1 %      MCV 92.2 fL      MCH 28.3 pg      MCHC 30.7 g/dL      RDW 12.4 %      RDW-SD 41.1 fl      MPV 9.2 fL      Platelets 234 10*3/mm3      Neutrophil % 57.6 %      Lymphocyte % 28.5 %      Monocyte % 9.7 %      Eosinophil % 2.5 %      Basophil % 1.5 %      Immature Grans % 0.2 %      Neutrophils, Absolute 3.51 10*3/mm3      Lymphocytes, Absolute 1.73 10*3/mm3      Monocytes, Absolute 0.59 10*3/mm3      Eosinophils, Absolute 0.15 10*3/mm3      Basophils, Absolute 0.09 10*3/mm3      Immature Grans, Absolute 0.01 10*3/mm3      nRBC 0.0 /100 WBC             Radiology:  Imaging Results (Last 48 Hours)       Procedure Component Value Units Date/Time    CT Abdomen Pelvis With Contrast [[id number]] Collected: 06/23/25 2056     Updated: 06/23/25 2105    Narrative:      CT ABDOMEN PELVIS W CONTRAST    Date of Exam: 6/23/2025 6:00 PM EDT    Indication: PELVIC PAIN.    Comparison: CT abdomen pelvis 8/27/2009. Abdominal radiograph 1/27/2012.    Technique: Axial CT images were obtained of the abdomen and pelvis following the uneventful intravenous administration of iodinated contrast. Sagittal and coronal reconstructions were performed.  Automated  exposure control and iterative reconstruction   methods were used.        Findings:  Included Chest: No significant abnormality.    Liver: No significant abnormality.     Gallbladder and biliary tree: No significant abnormality.     Spleen: No significant abnormality.     Pancreas: No significant abnormality.     Adrenal glands: No significant abnormality.     Kidneys and ureters: Left renal cyst. Nonobstructing left nephrolithiasis.. No hydroureteronephrosis.     Stomach and duodenum:No significant abnormality.    Small and large bowel: No significant abnormality. No evidence of bowel obstruction. Normal-appearing appendix.    Peritoneal cavity: Trace volume free fluid in the pelvis, which may be physiologic. No free air.    Bladder: Under distended and incompletely evaluated.     Pelvic organs: No significant abnormality. No visible surgical clips in the pelvis. Visualization of the fallopian tubes can be outside the resolution of CT in the absence of hydrosalpinx.     Vasculature: No significant abnormality     Lymph nodes: No pathologic appearing lymph nodes by imaging criteria.     Bones and soft tissues: Degenerative changes of the lower lumbar spine. No acute osseous abnormality.      Impression:      Impression:  No acute findings in the abdomen or pelvis.    Nonobstructing left nephrolithiasis.    Trace volume free fluid in the pelvis, which may be physiologic.        Electronically Signed: Greg Serrano MD    6/23/2025 9:03 PM EDT    Workstation ID: [id number]              Assessment & Plan       Pelvic pain    PID (pelvic inflammatory disease)          BHAVANI Chapman  06/24/25  09:16 EDT

## 2025-06-24 NOTE — H&P
Gynecology History and Physical    Chief complaint pelvic pain    Subjective     Patient is a 30 y.o. female presents with pelvic pain that has persisted for the past several months and has been evaluated at an OSH multiple times for this. She states that she initially was diagnosed with recurrent UTIs, possible ovarian cysts, then had an US performed in clinic with us that showed findings concerning for possible PID/TOA. She was given PID abx outpatient, but has had persistence in pain not improved with all regimens up to this point.     Patient was resting comfortably in bed eating dinner. She states that her pain is tolerable at rest, but is severe and worsened with movement.       Review of Systems  Patient reports nausea, dyspareunia. Denies fever, chills.       History  Past Medical History:   Diagnosis Date    ADHD     Bipolar 1 disorder, depressed      Past Surgical History:   Procedure Laterality Date    CYST REMOVAL Left 2013    hand    DILATION AND CURETTAGE, DIAGNOSTIC / THERAPEUTIC  2017    for undeliverd placenta    LAPAROSCOPIC TUBAL LIGATION       Family History   Problem Relation Age of Onset    Uterine cancer Mother     Lupus Mother     Fibromyalgia Mother     Hypertension Father     Diabetes Father      Social History     Tobacco Use    Smoking status: Never     Passive exposure: Past (as a child)    Smokeless tobacco: Never   Vaping Use    Vaping status: Never Used   Substance Use Topics    Alcohol use: Not Currently    Drug use: Never       Allergies  Allergies   Allergen Reactions    Penicillins Anaphylaxis       Medications  Medications Prior to Admission   Medication Sig Dispense Refill Last Dose/Taking    ASHWAGANDHA GUMMIES PO Take  by mouth.       ferrous sulfate 325 (65 FE) MG tablet Take 1 tablet by mouth Daily With Breakfast.       fluticasone (FLONASE) 50 MCG/ACT nasal spray SPRAY 1 SPRAY BY INTRANASAL ROUTE EVERY DAY       ondansetron ODT (ZOFRAN-ODT) 4 MG disintegrating tablet  Place 1 tablet on the tongue Every 8 (Eight) Hours As Needed for Nausea. 12 tablet 0     saccharomyces boulardii (FLORASTOR) 250 MG capsule Take 1 capsule by mouth 2 (Two) Times a Day.       vitamin D (ERGOCALCIFEROL) 1.25 MG (31353 UT) capsule capsule           Objective     Vital Signs  Vitals:    06/23/25 1409 06/23/25 1555 06/23/25 1937 06/23/25 2104   BP: 125/83 121/76 127/77    BP Location: Right arm Right arm Right arm    Patient Position: Lying Sitting Lying    Pulse: 81 87 97    Resp: 16 16 16    Temp: 97.7 °F (36.5 °C) 97.1 °F (36.2 °C) 98 °F (36.7 °C)    TempSrc: Oral Oral Oral    SpO2: 97% 100% 100%    Weight:    74.6 kg (164 lb 6.4 oz)       Physical Exam:      General Appearance:    Alert, cooperative, in no acute distress   Abdomen:     Soft, nonacute, non-tender, non-distended, no guarding   Genitalia:    Exam performed in clinic by NP +CMT, +adnexal tenderness   Extremities:   Moves all extremities well       Results Review:      Lab Results (last 48 hours)       Procedure Component Value Units Date/Time    Chlamydia trachomatis, Neisseria gonorrhoeae, Trichomonas vaginalis, PCR - Urine, Urine, Clean Catch [[id number]]  (Normal) Collected: 06/23/25 1439    Specimen: Urine, Clean Catch Updated: 06/23/25 1628     Chlamydia by PCR Not Detected     Neisseria gonorrhoeae by PCR Not Detected     Trichomonas vaginalis PCR Not Detected    Comprehensive Metabolic Panel [[id number]]  (Abnormal) Collected: 06/23/25 1430    Specimen: Blood from Arm, Left Updated: 06/23/25 1513     Glucose 88 mg/dL      BUN 4.2 mg/dL      Creatinine 0.56 mg/dL      Sodium 139 mmol/L      Potassium 3.9 mmol/L      Chloride 106 mmol/L      CO2 23.9 mmol/L      Calcium 8.7 mg/dL      Total Protein 6.5 g/dL      Albumin 4.2 g/dL      ALT (SGPT) 14 U/L      AST (SGOT) 17 U/L      Alkaline Phosphatase 63 U/L      Total Bilirubin 0.4 mg/dL      Globulin 2.3 gm/dL      A/G Ratio 1.8 g/dL      BUN/Creatinine Ratio 7.5     Anion Gap 9.1  mmol/L      eGFR 126.1 mL/min/1.73     Narrative:      GFR Categories in Chronic Kidney Disease (CKD)              GFR Category          GFR (mL/min/1.73)    Interpretation  G1                    90 or greater        Normal or high (1)  G2                    60-89                Mild decrease (1)  G3a                   45-59                Mild to moderate decrease  G3b                   30-44                Moderate to severe decrease  G4                    15-29                Severe decrease  G5                    14 or less           Kidney failure    (1)In the absence of evidence of kidney disease, neither GFR category G1 or G2 fulfill the criteria for CKD.    eGFR calculation 2021 CKD-EPI creatinine equation, which does not include race as a factor    Urinalysis With Culture If Indicated - Urine, Clean Catch [[id number]]  (Abnormal) Collected: 06/23/25 1430    Specimen: Urine, Clean Catch Updated: 06/23/25 1456     Color, UA Yellow     Appearance, UA Cloudy     pH, UA 8.0     Specific Gravity, UA 1.013     Glucose, UA Negative     Ketones, UA Negative     Bilirubin, UA Negative     Blood, UA Negative     Protein, UA Negative     Leuk Esterase, UA Negative     Nitrite, UA Negative     Urobilinogen, UA 0.2 E.U./dL    Narrative:      In absence of clinical symptoms, the presence of pyuria, bacteria, and/or nitrites on the urinalysis result does not correlate with infection.  Urine microscopic not indicated.    CBC & Differential [[id number]]  (Abnormal) Collected: 06/23/25 1430    Specimen: Blood from Arm, Left Updated: 06/23/25 5703    Narrative:      The following orders were created for panel order CBC & Differential.  Procedure                               Abnormality         Status                     ---------                               -----------         ------                     CBC Auto Differential[[id number]]        Abnormal            Final result                 Please view results for these tests  on the individual orders.    CBC Auto Differential [[id number]]  (Abnormal) Collected: 06/23/25 1430    Specimen: Blood from Arm, Left Updated: 06/23/25 1453     WBC 6.08 10*3/mm3      RBC 4.24 10*6/mm3      Hemoglobin 12.0 g/dL      Hematocrit 39.1 %      MCV 92.2 fL      MCH 28.3 pg      MCHC 30.7 g/dL      RDW 12.4 %      RDW-SD 41.1 fl      MPV 9.2 fL      Platelets 234 10*3/mm3      Neutrophil % 57.6 %      Lymphocyte % 28.5 %      Monocyte % 9.7 %      Eosinophil % 2.5 %      Basophil % 1.5 %      Immature Grans % 0.2 %      Neutrophils, Absolute 3.51 10*3/mm3      Lymphocytes, Absolute 1.73 10*3/mm3      Monocytes, Absolute 0.59 10*3/mm3      Eosinophils, Absolute 0.15 10*3/mm3      Basophils, Absolute 0.09 10*3/mm3      Immature Grans, Absolute 0.01 10*3/mm3      nRBC 0.0 /100 WBC              Imaging Results (Last 48 Hours)       Procedure Component Value Units Date/Time    CT Abdomen Pelvis With Contrast [[id number]] Collected: 06/23/25 2056     Updated: 06/23/25 2105    Narrative:      CT ABDOMEN PELVIS W CONTRAST    Date of Exam: 6/23/2025 6:00 PM EDT    Indication: PELVIC PAIN.    Comparison: CT abdomen pelvis 8/27/2009. Abdominal radiograph 1/27/2012.    Technique: Axial CT images were obtained of the abdomen and pelvis following the uneventful intravenous administration of iodinated contrast. Sagittal and coronal reconstructions were performed.  Automated exposure control and iterative reconstruction   methods were used.        Findings:  Included Chest: No significant abnormality.    Liver: No significant abnormality.     Gallbladder and biliary tree: No significant abnormality.     Spleen: No significant abnormality.     Pancreas: No significant abnormality.     Adrenal glands: No significant abnormality.     Kidneys and ureters: Left renal cyst. Nonobstructing left nephrolithiasis.. No hydroureteronephrosis.     Stomach and duodenum:No significant abnormality.    Small and large bowel: No significant  abnormality. No evidence of bowel obstruction. Normal-appearing appendix.    Peritoneal cavity: Trace volume free fluid in the pelvis, which may be physiologic. No free air.    Bladder: Under distended and incompletely evaluated.     Pelvic organs: No significant abnormality. No visible surgical clips in the pelvis. Visualization of the fallopian tubes can be outside the resolution of CT in the absence of hydrosalpinx.     Vasculature: No significant abnormality     Lymph nodes: No pathologic appearing lymph nodes by imaging criteria.     Bones and soft tissues: Degenerative changes of the lower lumbar spine. No acute osseous abnormality.      Impression:      Impression:  No acute findings in the abdomen or pelvis.    Nonobstructing left nephrolithiasis.    Trace volume free fluid in the pelvis, which may be physiologic.        Electronically Signed: Greg Serrano MD    6/23/2025 9:03 PM EDT    Workstation ID: [id number]            Assessment & Plan       Pelvic pain    PID (pelvic inflammatory disease)    VSS, afebrile, nontachycardic  Given clinical findings concerning for PID, started on abx w/ Ceftriaxone/Doxy/Flagyl IV  Can likely transition to PO course in AM, if no worsening in clinical picture.  No leukocytosis  CMP WNL  UA overall WNL  No pelvic abscess on CT, small amount of FF  If clinically changes, may order a pelvic US for further evaluation  No other etiologies noted on CT scan, other than L kidney stones that is non-obstructive,  Antiemetics & Analgesics PRN  Dispo: Extended observation and inpatient treatment. If improved in AM, would likely be able to DC home with close outpatient FU        Frieda Alcantar MD  06/23/25  21:31 EDT

## 2025-06-24 NOTE — DISCHARGE SUMMARY
Date of Discharge:  2025    Presenting Problem/History of Present Illness:  30 y.o. female  pelvic pain      Discharge Diagnosis: same      Hospital Course:  Patient is a 30 y.o. female  presented with pelvic pain since 2025.  She was seen in the office yesterday at our Gyn office and c/o severe pelvic pain, concern was for PID due to history of PID previously for which she had been on po meds.  She was sent to the hospital for evaluation.  Evaluation revealed all negative STD testing, normal WBC and normal CT A/P.  Pt is tolerating po well, ambulating, voiding, having normal Bms.  Pain is better this afternoon was severe yesterday and moderate this morning.  She was initially treated for PID but abx were stopped when normal CT and WBC and labs were found.    Pt has a long history and reports many diagnoses, states she thinks she had an SAB in April (she has had bilateral salpingectomies for contraception), she reports h/o UTI's, kidney stones, PID, pelvic fluid collection and ovarian cysts all during this time since March.   I discussed c pt that I don't have an explanation for her pain, however, as she is feeling better and all testing is negative/ normal I think outpatient mgmt is reasonable and pt is in agreement.  Pt to f/u in our office in the next few weeks for follow up and future mgmt.  I did review c her that she may be a good candidate for pelvic PT as there is a possible musculoskeletal component to her pain based on her description of symptoms, pt is agreeable to this.      Pertinent Test Results:   Lab Results (last 72 hours)       Procedure Component Value Units Date/Time    Chlamydia trachomatis, Neisseria gonorrhoeae, Trichomonas vaginalis, PCR - Urine, Urine, Clean Catch [[id number]]  (Normal) Collected: 25 1439    Specimen: Urine, Clean Catch Updated: 25 1628     Chlamydia by PCR Not Detected     Neisseria gonorrhoeae by PCR Not Detected     Trichomonas vaginalis PCR  Not Detected    Comprehensive Metabolic Panel [[id number]]  (Abnormal) Collected: 06/23/25 1430    Specimen: Blood from Arm, Left Updated: 06/23/25 1513     Glucose 88 mg/dL      BUN 4.2 mg/dL      Creatinine 0.56 mg/dL      Sodium 139 mmol/L      Potassium 3.9 mmol/L      Chloride 106 mmol/L      CO2 23.9 mmol/L      Calcium 8.7 mg/dL      Total Protein 6.5 g/dL      Albumin 4.2 g/dL      ALT (SGPT) 14 U/L      AST (SGOT) 17 U/L      Alkaline Phosphatase 63 U/L      Total Bilirubin 0.4 mg/dL      Globulin 2.3 gm/dL      A/G Ratio 1.8 g/dL      BUN/Creatinine Ratio 7.5     Anion Gap 9.1 mmol/L      eGFR 126.1 mL/min/1.73     Narrative:      GFR Categories in Chronic Kidney Disease (CKD)              GFR Category          GFR (mL/min/1.73)    Interpretation  G1                    90 or greater        Normal or high (1)  G2                    60-89                Mild decrease (1)  G3a                   45-59                Mild to moderate decrease  G3b                   30-44                Moderate to severe decrease  G4                    15-29                Severe decrease  G5                    14 or less           Kidney failure    (1)In the absence of evidence of kidney disease, neither GFR category G1 or G2 fulfill the criteria for CKD.    eGFR calculation 2021 CKD-EPI creatinine equation, which does not include race as a factor    Urinalysis With Culture If Indicated - Urine, Clean Catch [[id number]]  (Abnormal) Collected: 06/23/25 1430    Specimen: Urine, Clean Catch Updated: 06/23/25 1456     Color, UA Yellow     Appearance, UA Cloudy     pH, UA 8.0     Specific Gravity, UA 1.013     Glucose, UA Negative     Ketones, UA Negative     Bilirubin, UA Negative     Blood, UA Negative     Protein, UA Negative     Leuk Esterase, UA Negative     Nitrite, UA Negative     Urobilinogen, UA 0.2 E.U./dL    Narrative:      In absence of clinical symptoms, the presence of pyuria, bacteria, and/or nitrites on the urinalysis  result does not correlate with infection.  Urine microscopic not indicated.    CBC & Differential [[id number]]  (Abnormal) Collected: 06/23/25 1430    Specimen: Blood from Arm, Left Updated: 06/23/25 1453    Narrative:      The following orders were created for panel order CBC & Differential.  Procedure                               Abnormality         Status                     ---------                               -----------         ------                     CBC Auto Differential[[id number]]        Abnormal            Final result                 Please view results for these tests on the individual orders.    CBC Auto Differential [[id number]]  (Abnormal) Collected: 06/23/25 1430    Specimen: Blood from Arm, Left Updated: 06/23/25 1453     WBC 6.08 10*3/mm3      RBC 4.24 10*6/mm3      Hemoglobin 12.0 g/dL      Hematocrit 39.1 %      MCV 92.2 fL      MCH 28.3 pg      MCHC 30.7 g/dL      RDW 12.4 %      RDW-SD 41.1 fl      MPV 9.2 fL      Platelets 234 10*3/mm3      Neutrophil % 57.6 %      Lymphocyte % 28.5 %      Monocyte % 9.7 %      Eosinophil % 2.5 %      Basophil % 1.5 %      Immature Grans % 0.2 %      Neutrophils, Absolute 3.51 10*3/mm3      Lymphocytes, Absolute 1.73 10*3/mm3      Monocytes, Absolute 0.59 10*3/mm3      Eosinophils, Absolute 0.15 10*3/mm3      Basophils, Absolute 0.09 10*3/mm3      Immature Grans, Absolute 0.01 10*3/mm3      nRBC 0.0 /100 WBC           Imaging Results (Last 72 Hours)       Procedure Component Value Units Date/Time    CT Abdomen Pelvis With Contrast [[id number]] Collected: 06/23/25 2056     Updated: 06/23/25 2105    Narrative:      CT ABDOMEN PELVIS W CONTRAST    Date of Exam: 6/23/2025 6:00 PM EDT    Indication: PELVIC PAIN.    Comparison: CT abdomen pelvis 8/27/2009. Abdominal radiograph 1/27/2012.    Technique: Axial CT images were obtained of the abdomen and pelvis following the uneventful intravenous administration of iodinated contrast. Sagittal and coronal  reconstructions were performed.  Automated exposure control and iterative reconstruction   methods were used.        Findings:  Included Chest: No significant abnormality.    Liver: No significant abnormality.     Gallbladder and biliary tree: No significant abnormality.     Spleen: No significant abnormality.     Pancreas: No significant abnormality.     Adrenal glands: No significant abnormality.     Kidneys and ureters: Left renal cyst. Nonobstructing left nephrolithiasis.. No hydroureteronephrosis.     Stomach and duodenum:No significant abnormality.    Small and large bowel: No significant abnormality. No evidence of bowel obstruction. Normal-appearing appendix.    Peritoneal cavity: Trace volume free fluid in the pelvis, which may be physiologic. No free air.    Bladder: Under distended and incompletely evaluated.     Pelvic organs: No significant abnormality. No visible surgical clips in the pelvis. Visualization of the fallopian tubes can be outside the resolution of CT in the absence of hydrosalpinx.     Vasculature: No significant abnormality     Lymph nodes: No pathologic appearing lymph nodes by imaging criteria.     Bones and soft tissues: Degenerative changes of the lower lumbar spine. No acute osseous abnormality.      Impression:      Impression:  No acute findings in the abdomen or pelvis.    Nonobstructing left nephrolithiasis.    Trace volume free fluid in the pelvis, which may be physiologic.        Electronically Signed: Greg Serrano MD    6/23/2025 9:03 PM EDT    Workstation ID: [id number]            Condition on Discharge:  Good    Vital Signs:  Vitals:    06/24/25 0314 06/24/25 0731 06/24/25 1135 06/24/25 1511   BP: 114/75 91/56 101/67 118/76   BP Location: Right arm Right arm Right arm Right arm   Patient Position: Lying Lying Lying Sitting   Pulse: 85 76 95 76   Resp: 17 18 17 19   Temp: 97.9 °F (36.6 °C) 98.2 °F (36.8 °C) 98.1 °F (36.7 °C) 97.9 °F (36.6 °C)   TempSrc: Oral Oral Oral Oral    SpO2: 98% 98% 98% 100%   Weight:           Physical Exam:     General Appearance:    Alert, cooperative, in no acute distress       Abdomen:    Soft, nontender.         Extremities:       Moves all extremities well, no edema, no cyanosis, no             redness           Discharge Disposition:  Home    Discharge Medications:     Discharge Medications        Continue These Medications        Instructions Start Date   ferrous sulfate 325 (65 FE) MG tablet   325 mg, Oral, Daily With Breakfast      fluticasone 50 MCG/ACT nasal spray  Commonly known as: FLONASE   SPRAY 1 SPRAY BY INTRANASAL ROUTE EVERY DAY      ondansetron ODT 4 MG disintegrating tablet  Commonly known as: ZOFRAN-ODT   4 mg, Translingual, Every 8 Hours PRN      saccharomyces boulardii 250 MG capsule  Commonly known as: FLORASTOR   250 mg, Oral, 2 Times Daily      vitamin D 1.25 MG (98547 UT) capsule capsule  Commonly known as: ERGOCALCIFEROL              Stop These Medications      ASHWAGANDHA GUMMIES PO              Follow-up Appointments  Follow-up appointment with Dr. Frieda Alcantar in 2 weeks    Test Results Pending at Discharge  Pending Results       Procedure [Order ID] Specimen - Date/Time    NuSwab BV & Candida - Urine, Urine, Clean Catch [[id number]]     Specimen: Urine, Clean Catch              Farideh Arcos MD  06/24/25  15:14 EDT